# Patient Record
Sex: FEMALE | Race: WHITE | NOT HISPANIC OR LATINO | URBAN - METROPOLITAN AREA
[De-identification: names, ages, dates, MRNs, and addresses within clinical notes are randomized per-mention and may not be internally consistent; named-entity substitution may affect disease eponyms.]

---

## 2018-06-01 ENCOUNTER — HISTORICAL (OUTPATIENT)
Dept: ADMINISTRATIVE | Facility: HOSPITAL | Age: 63
End: 2018-06-01

## 2018-06-01 LAB
ABS NEUT (OLG): 5.7 X10(3)/MCL (ref 2.1–9.2)
ALBUMIN SERPL-MCNC: 3 GM/DL (ref 3.4–5)
ALBUMIN/GLOB SERPL: 0.8 RATIO (ref 1.1–2)
ALP SERPL-CCNC: 141 UNIT/L (ref 46–116)
ALT SERPL-CCNC: 56 UNIT/L (ref 12–78)
APPEARANCE, UA: CLEAR
AST SERPL-CCNC: 43 UNIT/L (ref 15–37)
BACTERIA #/AREA URNS AUTO: ABNORMAL /HPF
BASOPHILS # BLD AUTO: 0.1 X10(3)/MCL
BASOPHILS NFR BLD AUTO: 1 % (ref 0–2)
BILIRUB SERPL-MCNC: 0.7 MG/DL (ref 0.2–1)
BILIRUB UR QL STRIP: NEGATIVE
BILIRUBIN DIRECT+TOT PNL SERPL-MCNC: 0.18 MG/DL (ref 0–0.2)
BILIRUBIN DIRECT+TOT PNL SERPL-MCNC: 0.52 MG/DL (ref 0–0.8)
BUN SERPL-MCNC: 8.7 MG/DL (ref 7–18)
CALCIUM SERPL-MCNC: 9.3 MG/DL (ref 8.5–10.1)
CHLORIDE SERPL-SCNC: 104 MMOL/L (ref 98–107)
CO2 SERPL-SCNC: 28.8 MMOL/L (ref 21–32)
COLOR UR: YELLOW
CREAT SERPL-MCNC: 0.64 MG/DL (ref 0.6–1.3)
EOSINOPHIL # BLD AUTO: 0.2 X10(3)/MCL
EOSINOPHIL NFR BLD AUTO: 3 %
ERYTHROCYTE [DISTWIDTH] IN BLOOD BY AUTOMATED COUNT: 13.5 % (ref 11.5–17)
GLOBULIN SER-MCNC: 3.7 GM/DL (ref 2.4–3.5)
GLUCOSE (UA): NEGATIVE
GLUCOSE SERPL-MCNC: 179 MG/DL (ref 74–106)
HCT VFR BLD AUTO: 31.5 % (ref 37–47)
HGB BLD-MCNC: 10.9 GM/DL (ref 12–16)
HGB UR QL STRIP: NEGATIVE
KETONES UR QL STRIP: ABNORMAL
LEUKOCYTE ESTERASE UR QL STRIP: NEGATIVE
LYMPHOCYTES # BLD AUTO: 1.6 X10(3)/MCL
LYMPHOCYTES NFR BLD AUTO: 19 % (ref 13–40)
MCH RBC QN AUTO: 30.9 PG (ref 27–31)
MCHC RBC AUTO-ENTMCNC: 34.5 GM/DL (ref 33–36)
MCV RBC AUTO: 89.6 FL (ref 80–94)
MONOCYTES # BLD AUTO: 0.7 X10(3)/MCL
MONOCYTES NFR BLD AUTO: 9 % (ref 2–11)
NEUTROPHILS # BLD AUTO: 5.7 X10(3)/MCL (ref 2.1–9.2)
NEUTROPHILS NFR BLD AUTO: 69 % (ref 47–80)
NITRITE UR QL STRIP.AUTO: NEGATIVE
PH UR STRIP: 6 [PH] (ref 5–9)
PLATELET # BLD AUTO: 291 X10(3)/MCL (ref 130–400)
PMV BLD AUTO: 7.1 FL (ref 7.4–10.4)
POTASSIUM SERPL-SCNC: 4.3 MMOL/L (ref 3.5–5.1)
PROT SERPL-MCNC: 6.7 GM/DL (ref 6.4–8.2)
PROT UR QL STRIP: NEGATIVE
RBC # BLD AUTO: 3.51 X10(6)/MCL (ref 4.2–5.4)
RBC #/AREA URNS HPF: ABNORMAL /[HPF]
SODIUM SERPL-SCNC: 142 MMOL/L (ref 136–145)
SP GR UR STRIP: 1.01 (ref 1–1.03)
SQUAMOUS EPITHELIAL, UA: ABNORMAL
UROBILINOGEN UR STRIP-ACNC: 0.2
WBC # SPEC AUTO: 8.4 X10(3)/MCL (ref 4.5–11.5)
WBC #/AREA URNS AUTO: ABNORMAL /[HPF]

## 2018-06-09 LAB
BUN SERPL-MCNC: 13 MG/DL (ref 7–18)
CALCIUM SERPL-MCNC: 9.1 MG/DL (ref 8.5–10.1)
CHLORIDE SERPL-SCNC: 105 MMOL/L (ref 98–107)
CO2 SERPL-SCNC: 28 MMOL/L (ref 21–32)
CREAT SERPL-MCNC: 0.68 MG/DL (ref 0.6–1.3)
CREAT/UREA NIT SERPL: 19
GLUCOSE SERPL-MCNC: 118 MG/DL (ref 74–106)
POTASSIUM SERPL-SCNC: 4.8 MMOL/L (ref 3.5–5.1)
SODIUM SERPL-SCNC: 139 MMOL/L (ref 136–145)

## 2018-07-17 ENCOUNTER — HISTORICAL (OUTPATIENT)
Dept: ADMINISTRATIVE | Facility: HOSPITAL | Age: 63
End: 2018-07-17

## 2018-08-28 ENCOUNTER — HISTORICAL (OUTPATIENT)
Dept: ADMINISTRATIVE | Facility: HOSPITAL | Age: 63
End: 2018-08-28

## 2018-10-09 ENCOUNTER — HISTORICAL (OUTPATIENT)
Dept: ADMINISTRATIVE | Facility: HOSPITAL | Age: 63
End: 2018-10-09

## 2019-04-11 ENCOUNTER — HISTORICAL (OUTPATIENT)
Dept: INTENSIVE CARE | Facility: HOSPITAL | Age: 64
End: 2019-04-11

## 2019-04-24 ENCOUNTER — HISTORICAL (OUTPATIENT)
Dept: INTENSIVE CARE | Facility: HOSPITAL | Age: 64
End: 2019-04-24

## 2022-04-10 ENCOUNTER — HISTORICAL (OUTPATIENT)
Dept: ADMINISTRATIVE | Facility: HOSPITAL | Age: 67
End: 2022-04-10

## 2022-04-29 VITALS
DIASTOLIC BLOOD PRESSURE: 86 MMHG | BODY MASS INDEX: 25.23 KG/M2 | HEIGHT: 62 IN | BODY MASS INDEX: 25.23 KG/M2 | DIASTOLIC BLOOD PRESSURE: 66 MMHG | SYSTOLIC BLOOD PRESSURE: 127 MMHG | HEIGHT: 62 IN | WEIGHT: 137.13 LBS | WEIGHT: 143.06 LBS | SYSTOLIC BLOOD PRESSURE: 127 MMHG | DIASTOLIC BLOOD PRESSURE: 66 MMHG | WEIGHT: 137.13 LBS | HEIGHT: 62 IN | DIASTOLIC BLOOD PRESSURE: 66 MMHG | BODY MASS INDEX: 25.23 KG/M2 | SYSTOLIC BLOOD PRESSURE: 127 MMHG | HEIGHT: 62 IN | SYSTOLIC BLOOD PRESSURE: 124 MMHG | WEIGHT: 137.13 LBS | BODY MASS INDEX: 26.33 KG/M2

## 2022-05-03 NOTE — HISTORICAL [facility]
This is a historical note converted from Karen. Formatting and pictures may have been removed.  Please reference Karen for original formatting and attached multimedia. Chief Complaint  7.5 week s/p ORIF Right ankle trimal w/ syndesmosis repair here for eval and xrays. Doing good. In wheelchair wearing boot. sx 5/26/18 gl. 8/24/18.  History of Present Illness  Pt just over 7 weeks s/p ORIF right trimalleolar fx, syndesmosis. Here for x-ray and f/u evaluation. Doing better overall.  Review of Systems  Review of Systems?  ????Constitutional: ?Negative. ?  ????Eye: ?Negative. ?  ????Ear/Nose/Mouth/Throat: ?Negative. ?  ????Respiratory: ?Negative. ?  ????Cardiovascular: ?Negative. ?  ????Gastrointestinal: ?Negative. ?  ????Genitourinary: ?Negative. ?  ????Hematology/Lymphatics: ?Negative. ?  ????Endocrine: ?Negative. ?  ????Immunologic: ?Negative. ?  ????Musculoskeletal: ?Negative except HPI. ?  ????Integumentary: ?Negative. ?  ????Neurologic: ?Negative. ?  ????Psychiatric: ?Negative. ?  ????All other systems are negative  Physical Exam  Vitals & Measurements  HR:?74(Peripheral)? BP:?127/66?  HT:?157?cm? HT:?157?cm? HT:?157?cm? WT:?62.2?kg? WT:?62.2?kg? WT:?62.2?kg? BMI:?25.23?  ????General-Alert, oriented, no fever, chills  ????HEENT-Normocephalic, EOMI, moist oral mucosa, neck supple and nontender  ????Respiratory-Nonlabored respirations, symmetric chest expansion, chest wall nontender  ????Cardiac-Regular rate and rhythm, Pulses palpable in all extremities, Normal peripheral perfusion  ????Gastrointestinal-Soft, nontender, nondistended  ????Hemo/Lymph-No lymphadenopathy  ????Fqblilefhwiaizv-HHH-nouvfvlai are well-healed. No erythema, necrosis; she?does have surrounding blisters.?NVID. +TA/GS, EHL/FHL Intact.  ????Neurologic-Alert and oriented x4, cooperative  ????Dermatologic-Skin warm, pink, dry.  Assessment/Plan  1.?Closed displaced trimalleolar fracture of right ankle  Ordered:  Clinic Follow up, *Est. 08/17/18  3:00:00 CDT, Order for future visit, Closed displaced trimalleolar fracture of right ankle  Syndesmotic disruption of right ankle, [facility]  Post-Op follow-up visit [id number] PC, Closed displaced trimalleolar fracture of right ankle  Syndesmotic disruption of right ankle, [facility], 07/17/18 11:16:00 CDT  PT/OT External Referral, 07/17/18 11:16:00 CDT, Closed displaced trimalleolar fracture of right ankle  Syndesmotic disruption of right ankle, Evaluate and Treat, 3 X Week, Standard Precautions, Today, start 25% WB RLE in boot. in 2 weeks, advance to 50% WB RLE in boot. FROM...  ?  2.?Syndesmotic disruption of right ankle  Ordered:  Clinic Follow up, *Est. 08/17/18 3:00:00 CDT, Order for future visit, Closed displaced trimalleolar fracture of right ankle  Syndesmotic disruption of right ankle, [facility]  Post-Op follow-up visit [id number] PC, Closed displaced trimalleolar fracture of right ankle  Syndesmotic disruption of right ankle, [facility], 07/17/18 11:16:00 CDT  PT/OT External Referral, 07/17/18 11:16:00 CDT, Closed displaced trimalleolar fracture of right ankle  Syndesmotic disruption of right ankle, Evaluate and Treat, 3 X Week, Standard Precautions, Today, start 25% WB RLE in boot. in 2 weeks, advance to 50% WB RLE in boot. FROM...  ?  ?  ?  ?Advance to 25% WB to RLE in boot. 2 weeks from now, advance to 50% WB RLE in boot. FROM right ankle. Only need to wear boot when OOB. OK to bathe. RTC 1 month for x-rays and advancing WB to FWB.   Problem List/Past Medical History  Ongoing  Closed displaced trimalleolar fracture of right ankle  Syndesmotic disruption of right ankle  Historical  Breast cancer  Diabetes  Glaucoma  Hyperlipemia  Hypertension  Procedure/Surgical History  Excision of Right Lower Leg Skin, External Approach (06/05/2018), Closed Reduction Nasal Fracture (05/30/2018), Reposition Nasal Bone, External Approach (05/30/2018), Reposition Right Ankle  Joint with Internal Fixation Device, Open Approach (05/28/2018), Reposition Right Fibula with Internal Fixation Device, Open Approach (05/28/2018), Reposition Right Tibia with Internal Fixation Device, Open Approach (05/28/2018), ORIF Ankle (Right) (05/26/2018), Colonoscopy (02/18/2016), COLORECTAL CANCER SCREENING; COLONOSCOPY ON INDIVIDUAL NOT MEETING CRITERIA FOR HIGH RISK (02/18/2016), Inspection of Lower Intestinal Tract, Via Natural or Artificial Opening Endoscopic (02/18/2016), Breast reduction, Hysterectomy, mastectomy.  Medications  acetaminophen 325 mg oral tablet, 650 mg= 2 tab(s), Oral, q4hr, PRN  Amaryl 1 mg oral tablet, 1 mg= 1 tab(s), Oral, With Breakfast  AMLODIPINE BESYLATE 5 MG TAB  aspirin 81 mg oral Delayed Release (EC) tablet, 81 mg= 1 tab(s), Oral, BID  BRIMONIDINE TARTRATE 0.15%, 1 drop(s), Eye-Right, TID  Colace 100 mg oral capsule, 100 mg= 1 cap(s), Oral, BID  Cosopt ophthalmic solution, 1 drop(s), OPTH, BID  diclofenac 1% topical gel, 2 gm, TOP, QID, PRN, 2 refills  GLIMEPIRIDE 2 MG TABLET  lisinopril 5 mg oral tablet, 2.5 mg= 0.5 tab(s), Oral, At Bedtime  metformin 500 mg oral tablet, 1000 mg= 2 tab(s), Oral, BID  pioglitazone 30 mg oral tablet, 30 mg= 1 tab(s), Oral, Daily  pravastatin 80 mg oral tablet, 80 mg= 1 tab(s), Oral, Daily  Travatan 0.004% ophthalmic solution, 1 drop(s), OPTH, qPM  VASCEPA 1 GM CAPSULE  Allergies  penicillins?(Ringing in ears)  Social History  Alcohol - Denies Alcohol Use, 05/23/2014  Current, Wine, 1-2 times per year, 05/25/2018  Substance Abuse - Denies Substance Abuse, 05/23/2014  Never, 07/17/2018  Tobacco - Denies Tobacco Use, 05/23/2014  Never smoker Use:., 05/25/2018  Family History  Family history is negative  Immunizations  Vaccine Date Status   tetanus/diphtheria/pertussis, acel(Tdap) 05/25/2018 Given   Health Maintenance  Health Maintenance  ???Pending?(in the next year)  ??? ??Due?  ??? ? ? ?Alcohol Misuse Screening due??07/17/18??and every  1??year(s)  ??? ? ? ?Breast Cancer Screening due??07/17/18??Variable frequency  ??? ? ? ?Cervical Cancer Screening due??07/17/18??and every 5??year(s)  ??? ? ? ?Colorectal Screening due??07/17/18??Variable frequency  ??? ? ? ?Lipid Screening due??07/17/18??Variable frequency  ??? ? ? ?Zoster Vaccine due??07/17/18??and every 100??year(s)  ??? ??Due In Future?  ??? ? ? ?Influenza Vaccine not due until??10/02/18??and every 1??year(s)  ??? ? ? ?Aspirin Therapy for CVD Prevention not due until??06/13/19??and every 1??year(s)  ???Satisfied?(in the past 1 year)  ??? ??Satisfied?  ??? ? ? ?Aspirin Therapy for CVD Prevention on??06/13/18.??Satisfied by Ilene Pitt RN  ??? ? ? ?Blood Pressure Screening on??07/17/18.??Satisfied by Mukund, Leticia L. L.  ??? ? ? ?Body Mass Index Check on??07/17/18.??Satisfied by Mukund, Leticia L. L.  ??? ? ? ?Depression Screening on??07/17/18.??Satisfied by Mukund, Leticia L. L.  ??? ? ? ?Diabetes Screening on??06/09/18.??Satisfied by Alexandra Ventura  ??? ? ? ?Hypertension Management-Blood Pressure on??07/17/18.??Satisfied by Mukund, Leticia L. L.  ??? ? ? ?Obesity Screening on??07/17/18.??Satisfied by Mukund, Leticia L. L.  ??? ? ? ?Tetanus Vaccine on??05/25/18.??Satisfied by Kei Oconnor RN  ??? ? ? ?Tobacco Use Screening on??07/17/18.??Satisfied by Mukund, Leticia L. L.  ?  ?  Diagnostic Results  X-ray right ankle shows acceptable alignment, intact hardware, evidence of interval consolidation noted.      Patient seen and examined  Agree with above

## 2022-05-03 NOTE — HISTORICAL [facility]
This is a historical note converted from Karen. Formatting and pictures may have been removed.  Please reference Cerjaki for original formatting and attached multimedia. Chief Complaint  3 month follow up orif right ankle trimalleolar fx with syndesmosis repair, in boot  History of Present Illness  Pt just over?3 months?s/p ORIF right trimalleolar fx, syndesmosis. Here for x-ray and f/u evaluation. Doing better overall.  Review of Systems  Constitutional: negative except as stated in HPI  Eye: negative except as stated in HPI  ENMT: negative except as stated in HPI  Respiratory: negative except as stated in HPI  Cardiovascular: negative except as stated in HPI  Gastrointestinal: negative except as stated in HPI  Genitourinary: negative except as stated in HPI  Hema/Lymph: negative except as stated in HPI  Endocrine: negative except as stated in HPI  Immunologic: negative except as stated in HPI  Musculoskeletal: negative except as stated in HPI  Integumentary: negative except as stated in HPI  Neurologic: negative except as stated in HPI  ?   All Other ROS_ ?negative except as stated in HPI  Physical Exam  Vitals & Measurements  HR:?74(Peripheral)? RR:?20? BP:?127/66?  HT:?157?cm? HT:?157?cm? WT:?62.2?kg? WT:?62.2?kg? BMI:?25.23?  General-Alert, oriented, no fever, chills  HEENT-Normocephalic, EOMI, moist oral mucosa, neck supple and nontender  Respiratory-Nonlabored respirations, symmetric chest expansion, chest wall nontender  Cardiac-Regular rate and rhythm, Pulses palpable in all extremities, Normal peripheral perfusion  Gastrointestinal-Soft, nontender, nondistended  Hemo/Lymph-No lymphadenopathy  Nimiqzdwzsjekzj-VHP-ycaqmbgpa well healed. NVID. +TA/GA, EHL/FHL intact.  ?Neurologic-Alert and oriented x4, cooperative  ?Dermatologic-Skin warm, pink, dry.  ?  Assessment/Plan  Closed displaced trimalleolar fracture of right ankle  Ordered:  Clinic Follow up, *Est. 10/09/18 3:00:00 CDT, Order for future visit,  Closed displaced trimalleolar fracture of right ankle  Syndesmotic disruption of right ankle, [facility]  Office/Outpatient Visit Level 3 Established [id number] PC, Closed displaced trimalleolar fracture of right ankle  Syndesmotic disruption of right ankle, [facility], 08/28/18 8:55:00 CDT  ?  Syndesmotic disruption of right ankle  Ordered:  Clinic Follow up, *Est. 10/09/18 3:00:00 CDT, Order for future visit, Closed displaced trimalleolar fracture of right ankle  Syndesmotic disruption of right ankle, [facility]  Office/Outpatient Visit Level 3 Established [id number] PC, Closed displaced trimalleolar fracture of right ankle  Syndesmotic disruption of right ankle, [facility], 08/28/18 8:55:00 CDT  ?  ?  ?  Patient doing well. Advance to FWBAT in boot?with walker or cane. Only need to wear boot when OOB. RTC in 6 weeks for x-rays and evaluation.   Problem List/Past Medical History  Ongoing  Closed displaced trimalleolar fracture of right ankle  Syndesmotic disruption of right ankle  Historical  Breast cancer  Diabetes  Glaucoma  Hyperlipemia  Hypertension  Procedure/Surgical History  Excision of Right Lower Leg Skin, External Approach (06/05/2018)  Closed Reduction Nasal Fracture (05/30/2018)  Reposition Nasal Bone, External Approach (05/30/2018)  Reposition Right Ankle Joint with Internal Fixation Device, Open Approach (05/28/2018)  Reposition Right Fibula with Internal Fixation Device, Open Approach (05/28/2018)  Reposition Right Tibia with Internal Fixation Device, Open Approach (05/28/2018)  ORIF Ankle (Right) (05/26/2018)  Colonoscopy (02/18/2016)  COLORECTAL CANCER SCREENING; COLONOSCOPY ON INDIVIDUAL NOT MEETING CRITERIA FOR HIGH RISK (02/18/2016)  Inspection of Lower Intestinal Tract, Via Natural or Artificial Opening Endoscopic (02/18/2016)  Breast reduction  Hysterectomy  mastectomy   Medications  acetaminophen 325 mg oral tablet, 650 mg= 2 tab(s), Oral, q4hr,  PRN  Amaryl 1 mg oral tablet, 1 mg= 1 tab(s), Oral, With Breakfast  AMLODIPINE BESYLATE 5 MG TAB  aspirin 81 mg oral Delayed Release (EC) tablet, 81 mg= 1 tab(s), Oral, BID  Bactrim  mg-160 mg oral tablet, 1 tab(s), Oral, BID  BRIMONIDINE TARTRATE 0.15%, 1 drop(s), Eye-Right, TID  Colace 100 mg oral capsule, 100 mg= 1 cap(s), Oral, BID  Cosopt ophthalmic solution, 1 drop(s), OPTH, BID  diclofenac 1% topical gel, 2 gm, TOP, QID, PRN, 2 refills  GLIMEPIRIDE 2 MG TABLET  lisinopril 5 mg oral tablet, 2.5 mg= 0.5 tab(s), Oral, At Bedtime  metformin 500 mg oral tablet, 1000 mg= 2 tab(s), Oral, BID  pioglitazone 30 mg oral tablet, 30 mg= 1 tab(s), Oral, Daily  pravastatin 80 mg oral tablet, 80 mg= 1 tab(s), Oral, Daily  Travatan 0.004% ophthalmic solution, 1 drop(s), OPTH, qPM  VASCEPA 1 GM CAPSULE  Allergies  penicillins?(Ringing in ears)  Social History  Alcohol - Denies Alcohol Use, 05/23/2014  Current, Wine, 1-2 times per year, 05/25/2018  Substance Abuse - Denies Substance Abuse, 05/23/2014  Never, 07/17/2018  Tobacco - Denies Tobacco Use, 05/23/2014  Never smoker Use:., 05/25/2018  Family History  Family history is negative  Immunizations  Vaccine Date Status   tetanus/diphtheria/pertussis, acel(Tdap) 05/25/2018 Given   Health Maintenance  Health Maintenance  ???Pending?(in the next year)  ??? ??Due?  ??? ? ? ?Alcohol Misuse Screening due??08/28/18??and every 1??year(s)  ??? ? ? ?Breast Cancer Screening due??08/28/18??and every?  ??? ? ? ?Cervical Cancer Screening due??08/28/18??and every?  ??? ? ? ?Diabetes Maintenance-Eye Exam due??08/28/18??and every?  ??? ? ? ?Diabetes Maintenance-Foot Exam due??08/28/18??and every?  ??? ? ? ?Diabetes Maintenance-HgbA1c due??08/28/18??and every?  ??? ? ? ?Diabetes Maintenance-Fasting Lipid Profile due??08/28/18??and every?  ??? ? ? ?Influenza Vaccine due??08/28/18??and every?  ??? ? ? ?Zoster Vaccine due??08/28/18??and every 100??year(s)  ??? ??Due In Future?  ??? ? ?  ?Hypertension Management-BMP not due until??06/09/19??and every 1??year(s)  ??? ? ? ?Aspirin Therapy for CVD Prevention not due until??06/13/19??and every 1??year(s)  ???Satisfied?(in the past 1 year)  ??? ??Satisfied?  ??? ? ? ?Aspirin Therapy for CVD Prevention on??06/13/18.??Satisfied by Sweetie PERDOMO, Ilene ANGELO  ??? ? ? ?Blood Pressure Screening on??08/28/18.??Satisfied by Soraya Belcher  ??? ? ? ?Body Mass Index Check on??08/28/18.??Satisfied by Soraya Belcher  ??? ? ? ?Depression Screening on??08/28/18.??Satisfied by Soraya Belcher  ??? ? ? ?Diabetes Screening on??06/09/18.??Satisfied by Alexandra Ventura  ??? ? ? ?Hypertension Management-Blood Pressure on??08/28/18.??Satisfied by Soraya Belcher  ??? ? ? ?Obesity Screening on??08/28/18.??Satisfied by Soraya Belcher  ??? ? ? ?Tetanus Vaccine on??05/25/18.??Satisfied by Anastasia PERDOMO, Kei MILLS  ?  ?  Diagnostic Results  X-ray right ankle shows acceptable alignment, intact hardware, evidence of interval consolidation noted  ?      Patient seen and examined  Agree with above

## 2022-05-03 NOTE — HISTORICAL [facility]
This is a historical note converted from Karen. Formatting and pictures may have been removed.  Please reference Cerjaki for original formatting and attached multimedia. Chief Complaint  4.5 month follow up orif right ankle, ambulating with walker, improvement noted  History of Present Illness  Pt just over?34.5 months?s/p ORIF right trimalleolar fx, syndesmosis. Here for x-ray and f/u evaluation. Doing better overall.  Review of Systems  Constitutional: negative except as stated in HPI  Eye: negative except as stated in HPI  ENMT: negative except as stated in HPI  Respiratory: negative except as stated in HPI  Cardiovascular: negative except as stated in HPI  Gastrointestinal: negative except as stated in HPI  Genitourinary: negative except as stated in HPI  Hema/Lymph: negative except as stated in HPI  Endocrine: negative except as stated in HPI  Immunologic: negative except as stated in HPI  Musculoskeletal: negative except as stated in HPI  Integumentary: negative except as stated in HPI  Neurologic: negative except as stated in HPI  ?   All Other ROS_ ?negative except as stated in HPI  Physical Exam  Vitals & Measurements  HR:?74(Peripheral)? RR:?20? BP:?127/66?  HT:?157?cm? HT:?157?cm? WT:?62.2?kg? WT:?62.2?kg? BMI:?25.23?  General-Alert, oriented, no fever, chills  HEENT-Normocephalic, EOMI, moist oral mucosa, neck supple and nontender  Respiratory-Nonlabored respirations, symmetric chest expansion, chest wall nontender  Cardiac-Regular rate and rhythm, Pulses palpable in all extremities, Normal peripheral perfusion  Gastrointestinal-Soft, nontender, nondistended  Hemo/Lymph-No lymphadenopathy  Eligvgqmhzdmwca-ZCO-dternuaqz well healed. NVID. +TA/GA, EHL/FHL intact.  ?Neurologic-Alert and oriented x4, cooperative  ?Dermatologic-Skin warm, pink, dry.  ?  Assessment/Plan  Closed displaced trimalleolar fracture of right ankle  Ordered:  Office/Outpatient Visit Level 3 Established [id number] PC, Closed displaced  trimalleolar fracture of right ankle  Syndesmotic disruption of right ankle, [facility], 10/09/18 10:27:00 CDT  ?  Syndesmotic disruption of right ankle  Ordered:  Office/Outpatient Visit Level 3 Established [id number] PC, Closed displaced trimalleolar fracture of right ankle  Syndesmotic disruption of right ankle, [facility], 10/09/18 10:27:00 CDT  ?  Orders:  Clinic Follow-up PRN, 10/09/18 10:27:00 CDT, Future Order, [facility]  ?  ?  Patient doing well.?Continue FWBAT in boot?with walker or cane. Only need to wear boot when OOB. No restrictions. She will f/u on PRN basis for new/worsening orthopedic issues.   Problem List/Past Medical History  Ongoing  Closed displaced trimalleolar fracture of right ankle  Syndesmotic disruption of right ankle  Historical  Breast cancer  Diabetes  Glaucoma  Hyperlipemia  Hypertension  Procedure/Surgical History  Excision of Right Lower Leg Skin, External Approach (06/05/2018)  Closed Reduction Nasal Fracture (05/30/2018)  Reposition Nasal Bone, External Approach (05/30/2018)  Reposition Right Ankle Joint with Internal Fixation Device, Open Approach (05/28/2018)  Reposition Right Fibula with Internal Fixation Device, Open Approach (05/28/2018)  Reposition Right Tibia with Internal Fixation Device, Open Approach (05/28/2018)  ORIF Ankle (Right) (05/26/2018)  Colonoscopy (02/18/2016)  COLORECTAL CANCER SCREENING; COLONOSCOPY ON INDIVIDUAL NOT MEETING CRITERIA FOR HIGH RISK (02/18/2016)  Inspection of Lower Intestinal Tract, Via Natural or Artificial Opening Endoscopic (02/18/2016)  Breast reduction  Hysterectomy  mastectomy   Medications  acetaminophen 325 mg oral tablet, 650 mg= 2 tab(s), Oral, q4hr, PRN  Amaryl 1 mg oral tablet, 1 mg= 1 tab(s), Oral, With Breakfast  AMLODIPINE BESYLATE 5 MG TAB  aspirin 81 mg oral Delayed Release (EC) tablet, 81 mg= 1 tab(s), Oral, BID  BRIMONIDINE TARTRATE 0.15%, 1 drop(s), Eye-Right, TID  Colace 100 mg oral  capsule, 100 mg= 1 cap(s), Oral, BID  Cosopt ophthalmic solution, 1 drop(s), OPTH, BID  diclofenac 1% topical gel, 2 gm, TOP, QID, PRN, 2 refills  GLIMEPIRIDE 2 MG TABLET  lisinopril 5 mg oral tablet, 2.5 mg= 0.5 tab(s), Oral, At Bedtime  metformin 500 mg oral tablet, 1000 mg= 2 tab(s), Oral, BID  pioglitazone 30 mg oral tablet, 30 mg= 1 tab(s), Oral, Daily  pravastatin 80 mg oral tablet, 80 mg= 1 tab(s), Oral, Daily  Travatan 0.004% ophthalmic solution, 1 drop(s), OPTH, qPM  VASCEPA 1 GM CAPSULE  Allergies  penicillins?(Ringing in ears)  Social History  Alcohol - Denies Alcohol Use, 05/23/2014  Current, Wine, 1-2 times per year, 05/25/2018  Substance Abuse - Denies Substance Abuse, 05/23/2014  Never, 07/17/2018  Tobacco - Denies Tobacco Use, 05/23/2014  Never smoker Use:., 05/25/2018  Family History  Family history is negative  Immunizations  Vaccine Date Status   tetanus/diphtheria/pertussis, acel(Tdap) 05/25/2018 Given   Health Maintenance  Health Maintenance  ???Pending?(in the next year)  ??? ??Due?  ??? ? ? ?Alcohol Misuse Screening due??10/09/18??and every 1??year(s)  ??? ? ? ?Breast Cancer Screening due??10/09/18??and every?  ??? ? ? ?Cervical Cancer Screening due??10/09/18??and every?  ??? ? ? ?Diabetes Maintenance-Eye Exam due??10/09/18??and every?  ??? ? ? ?Diabetes Maintenance-Foot Exam due??10/09/18??and every?  ??? ? ? ?Diabetes Maintenance-HgbA1c due??10/09/18??and every?  ??? ? ? ?Diabetes Maintenance-Fasting Lipid Profile due??10/09/18??and every?  ??? ? ? ?Influenza Vaccine due??10/09/18??and every?  ??? ? ? ?Zoster Vaccine due??10/09/18??and every 100??year(s)  ??? ??Due In Future?  ??? ? ? ?ADL Screening not due until??05/31/19??and every 1??year(s)  ??? ? ? ?Hypertension Management-BMP not due until??06/09/19??and every 1??year(s)  ??? ? ? ?Aspirin Therapy for CVD Prevention not due until??06/13/19??and every 1??year(s)  ???Satisfied?(in the past 1 year)  ??? ??Satisfied?  ??? ? ? ?ADL Screening  on??05/31/18.??Satisfied by Jasmeet PERDOMO, Cherise SEO  ??? ? ? ?Aspirin Therapy for CVD Prevention on??06/13/18.??Satisfied by Sweetie PERDOMO, Ilene ANGELO  ??? ? ? ?Blood Pressure Screening on??10/09/18.??Satisfied by Soraya Belcher  ??? ? ? ?Body Mass Index Check on??10/09/18.??Satisfied by Soraya Belcher  ??? ? ? ?Depression Screening on??10/09/18.??Satisfied by Soraya Belcher  ??? ? ? ?Diabetes Screening on??06/09/18.??Satisfied by Alexandra Ventura  ??? ? ? ?Hypertension Management-Blood Pressure on??10/09/18.??Satisfied by Soraya Belcher  ??? ? ? ?Obesity Screening on??10/09/18.??Satisfied by Soraya Belcher  ??? ? ? ?Tetanus Vaccine on??05/25/18.??Satisfied by Anastasia PERDOMO, Kei MILLS  ?  ?  Diagnostic Results  X-ray right ankle shows acceptable alignment, intact hardware, evidence of interval consolidation noted      Patient seen and examined  Agree with above

## 2023-10-05 DIAGNOSIS — R74.8 ELEVATED LIVER ENZYMES: Primary | ICD-10-CM
